# Patient Record
Sex: FEMALE | Race: WHITE | NOT HISPANIC OR LATINO | Employment: PART TIME | URBAN - METROPOLITAN AREA
[De-identification: names, ages, dates, MRNs, and addresses within clinical notes are randomized per-mention and may not be internally consistent; named-entity substitution may affect disease eponyms.]

---

## 2017-03-12 ENCOUNTER — HOSPITAL ENCOUNTER (EMERGENCY)
Facility: MEDICAL CENTER | Age: 32
End: 2017-03-12

## 2017-03-12 VITALS
OXYGEN SATURATION: 98 % | TEMPERATURE: 96.6 F | DIASTOLIC BLOOD PRESSURE: 78 MMHG | RESPIRATION RATE: 18 BRPM | SYSTOLIC BLOOD PRESSURE: 122 MMHG | HEART RATE: 77 BPM

## 2017-03-12 PROCEDURE — 302449 STATCHG TRIAGE ONLY (STATISTIC)

## 2025-02-17 ENCOUNTER — APPOINTMENT (OUTPATIENT)
Dept: RADIOLOGY | Facility: MEDICAL CENTER | Age: 40
End: 2025-02-17
Attending: EMERGENCY MEDICINE
Payer: COMMERCIAL

## 2025-02-17 ENCOUNTER — HOSPITAL ENCOUNTER (EMERGENCY)
Facility: MEDICAL CENTER | Age: 40
End: 2025-02-17
Attending: EMERGENCY MEDICINE
Payer: COMMERCIAL

## 2025-02-17 VITALS
HEART RATE: 84 BPM | DIASTOLIC BLOOD PRESSURE: 80 MMHG | HEIGHT: 64 IN | BODY MASS INDEX: 38.28 KG/M2 | RESPIRATION RATE: 20 BRPM | SYSTOLIC BLOOD PRESSURE: 133 MMHG | OXYGEN SATURATION: 96 % | TEMPERATURE: 97 F | WEIGHT: 224.21 LBS

## 2025-02-17 DIAGNOSIS — R09.81 NASAL CONGESTION: ICD-10-CM

## 2025-02-17 DIAGNOSIS — R05.9 COUGH, UNSPECIFIED TYPE: ICD-10-CM

## 2025-02-17 DIAGNOSIS — B34.9 VIRAL SYNDROME: ICD-10-CM

## 2025-02-17 LAB
ALBUMIN SERPL BCP-MCNC: 4 G/DL (ref 3.2–4.9)
ALBUMIN/GLOB SERPL: 1.3 G/DL
ALP SERPL-CCNC: 52 U/L (ref 30–99)
ALT SERPL-CCNC: 11 U/L (ref 2–50)
ANION GAP SERPL CALC-SCNC: 12 MMOL/L (ref 7–16)
AST SERPL-CCNC: 15 U/L (ref 12–45)
BASOPHILS # BLD AUTO: 0.4 % (ref 0–1.8)
BASOPHILS # BLD: 0.03 K/UL (ref 0–0.12)
BILIRUB SERPL-MCNC: 0.4 MG/DL (ref 0.1–1.5)
BUN SERPL-MCNC: 11 MG/DL (ref 8–22)
CALCIUM ALBUM COR SERPL-MCNC: 8.9 MG/DL (ref 8.5–10.5)
CALCIUM SERPL-MCNC: 8.9 MG/DL (ref 8.5–10.5)
CHLORIDE SERPL-SCNC: 104 MMOL/L (ref 96–112)
CO2 SERPL-SCNC: 24 MMOL/L (ref 20–33)
CREAT SERPL-MCNC: 0.93 MG/DL (ref 0.5–1.4)
EKG IMPRESSION: NORMAL
EOSINOPHIL # BLD AUTO: 0.21 K/UL (ref 0–0.51)
EOSINOPHIL NFR BLD: 2.5 % (ref 0–6.9)
ERYTHROCYTE [DISTWIDTH] IN BLOOD BY AUTOMATED COUNT: 41.5 FL (ref 35.9–50)
GFR SERPLBLD CREATININE-BSD FMLA CKD-EPI: 80 ML/MIN/1.73 M 2
GLOBULIN SER CALC-MCNC: 3.1 G/DL (ref 1.9–3.5)
GLUCOSE SERPL-MCNC: 125 MG/DL (ref 65–99)
HCT VFR BLD AUTO: 43.8 % (ref 37–47)
HGB BLD-MCNC: 14.6 G/DL (ref 12–16)
IMM GRANULOCYTES # BLD AUTO: 0.03 K/UL (ref 0–0.11)
IMM GRANULOCYTES NFR BLD AUTO: 0.4 % (ref 0–0.9)
LYMPHOCYTES # BLD AUTO: 3.18 K/UL (ref 1–4.8)
LYMPHOCYTES NFR BLD: 38.2 % (ref 22–41)
MCH RBC QN AUTO: 29.8 PG (ref 27–33)
MCHC RBC AUTO-ENTMCNC: 33.3 G/DL (ref 32.2–35.5)
MCV RBC AUTO: 89.4 FL (ref 81.4–97.8)
MONOCYTES # BLD AUTO: 0.53 K/UL (ref 0–0.85)
MONOCYTES NFR BLD AUTO: 6.4 % (ref 0–13.4)
NEUTROPHILS # BLD AUTO: 4.34 K/UL (ref 1.82–7.42)
NEUTROPHILS NFR BLD: 52.1 % (ref 44–72)
NRBC # BLD AUTO: 0 K/UL
NRBC BLD-RTO: 0 /100 WBC (ref 0–0.2)
NT-PROBNP SERPL IA-MCNC: <36 PG/ML (ref 0–125)
PLATELET # BLD AUTO: 240 K/UL (ref 164–446)
PMV BLD AUTO: 8.6 FL (ref 9–12.9)
POTASSIUM SERPL-SCNC: 4.2 MMOL/L (ref 3.6–5.5)
PROT SERPL-MCNC: 7.1 G/DL (ref 6–8.2)
RBC # BLD AUTO: 4.9 M/UL (ref 4.2–5.4)
SODIUM SERPL-SCNC: 140 MMOL/L (ref 135–145)
TROPONIN T SERPL-MCNC: <6 NG/L (ref 6–19)
WBC # BLD AUTO: 8.3 K/UL (ref 4.8–10.8)

## 2025-02-17 PROCEDURE — 93005 ELECTROCARDIOGRAM TRACING: CPT | Mod: TC

## 2025-02-17 PROCEDURE — 99284 EMERGENCY DEPT VISIT MOD MDM: CPT

## 2025-02-17 PROCEDURE — 83880 ASSAY OF NATRIURETIC PEPTIDE: CPT

## 2025-02-17 PROCEDURE — 85025 COMPLETE CBC W/AUTO DIFF WBC: CPT

## 2025-02-17 PROCEDURE — 80053 COMPREHEN METABOLIC PANEL: CPT

## 2025-02-17 PROCEDURE — 93005 ELECTROCARDIOGRAM TRACING: CPT | Mod: TC | Performed by: EMERGENCY MEDICINE

## 2025-02-17 PROCEDURE — 71045 X-RAY EXAM CHEST 1 VIEW: CPT

## 2025-02-17 PROCEDURE — 84484 ASSAY OF TROPONIN QUANT: CPT

## 2025-02-17 RX ORDER — FLUTICASONE PROPIONATE 50 MCG
1 SPRAY, SUSPENSION (ML) NASAL DAILY
Qty: 11.1 ML | Refills: 0 | Status: SHIPPED | OUTPATIENT
Start: 2025-02-17 | End: 2025-02-17

## 2025-02-17 RX ORDER — FLUTICASONE PROPIONATE 50 MCG
1 SPRAY, SUSPENSION (ML) NASAL DAILY
Qty: 11.1 ML | Refills: 0 | Status: SHIPPED | OUTPATIENT
Start: 2025-02-17

## 2025-02-17 NOTE — ED PROVIDER NOTES
CHIEF COMPLAINT  Chief Complaint   Patient presents with    Flu Like Symptoms     X1 wk  Sore throat, body ache, chills, cough, fever, ear ache. Headache       LIMITATION TO HISTORY   Select: none    HPI    Toya Morocho is a 39 y.o. female who presents to the Emergency Department planing of cough nasal congestion body aches.  The patient states that for the past week she has been having sore throat cough body aches chills fevers and ear ache.  The patient was seen yesterday at [facility] apparently had a negative viral panel was started on Tessalon Perles.  The patient presents today just because she is not feeling any better is complaining of increasing left ear pain nasal congestion she has been having some nosebleeds.  Patient denies any other symptoms.    OUTSIDE HISTORIAN(S):  Select: None    EXTERNAL RECORDS REVIEWED  Select: Other evaluated the patient's follow-up from yesterday [facility].      PAST MEDICAL HISTORY  Past Medical History:   Diagnosis Date    ASTHMA     Renal disorder     kidney stones, ureteral stents x2 removed     .    SURGICAL HISTORY  Past Surgical History:   Procedure Laterality Date    OTHER      ureteral stents x2    OTHER ORTHOPEDIC SURGERY      left shoulder         FAMILY HISTORY  History reviewed. No pertinent family history.       SOCIAL HISTORY  Social History     Socioeconomic History    Marital status:      Spouse name: Not on file    Number of children: Not on file    Years of education: Not on file    Highest education level: Not on file   Occupational History    Not on file   Tobacco Use    Smoking status: Never    Smokeless tobacco: Not on file   Substance and Sexual Activity    Alcohol use: No    Drug use: No    Sexual activity: Not on file   Other Topics Concern    Not on file   Social History Narrative    Not on file     Social Drivers of Health     Financial Resource Strain: Not on file   Food Insecurity: Not on file   Transportation  "Needs: Not on file   Physical Activity: Not on file   Stress: Not on file   Social Connections: Not on file   Intimate Partner Violence: Not on file   Housing Stability: Not on file         CURRENT MEDICATIONS  No current facility-administered medications on file prior to encounter.     Current Outpatient Medications on File Prior to Encounter   Medication Sig Dispense Refill    metoprolol (LOPRESSOR) 25 MG TABS Take 25 mg by mouth 2 times a day.      Prenatal Multivit-Min-Fe-FA (PRENATAL 1 + IRON PO) Take  by mouth.      albuterol (PROVENTIL) 90 MCG/ACT AERS Inhale 2 Puffs by mouth every 6 hours as needed for Shortness of Breath. 1 Inhaler 0           ALLERGIES  Allergies   Allergen Reactions    Keflex [Cephalexin] Hives    Latex        PHYSICAL EXAM  VITAL SIGNS:/83   Pulse 86   Temp 36.4 °C (97.6 °F) (Temporal)   Resp 18   Ht 1.626 m (5' 4\")   Wt 102 kg (224 lb 3.3 oz)   SpO2 97%   BMI 38.49 kg/m²     Constitutional: Well-developed no acute distress   HENT: Normocephalic, Atraumatic, Bilateral external ears normal.  Posterior pharynx is normal the patient's bilateral TMs are normal.  The patient does have some dry mucous membranes in her nose.  Eyes:  conjunctiva are normal.   Neck: Supple.  Nontender midline  Cardiovascular: Regular rate and rhythm without murmurs gallops or rubs.   Thorax & Lungs: No respiratory distress. Breathing comfortably. Lungs are clear to auscultation bilaterally, there are no wheezes no rales. Chest wall is nontender.  Abdomen: Soft, non distended, non tender   Skin: Warm, Dry, No erythema,   Back: No tenderness, No CVA tenderness.  Psychiatric: Affect normal, Judgment normal, Mood normal.       DIAGNOSTIC STUDIES / PROCEDURES  EKG  EKG interpreted below by myself    LABS    Results for orders placed or performed during the hospital encounter of 02/17/25   CBC with Differential    Collection Time: 02/17/25  9:41 AM   Result Value Ref Range    WBC 8.3 4.8 - 10.8 K/uL    RBC " 4.90 4.20 - 5.40 M/uL    Hemoglobin 14.6 12.0 - 16.0 g/dL    Hematocrit 43.8 37.0 - 47.0 %    MCV 89.4 81.4 - 97.8 fL    MCH 29.8 27.0 - 33.0 pg    MCHC 33.3 32.2 - 35.5 g/dL    RDW 41.5 35.9 - 50.0 fL    Platelet Count 240 164 - 446 K/uL    MPV 8.6 (L) 9.0 - 12.9 fL    Neutrophils-Polys 52.10 44.00 - 72.00 %    Lymphocytes 38.20 22.00 - 41.00 %    Monocytes 6.40 0.00 - 13.40 %    Eosinophils 2.50 0.00 - 6.90 %    Basophils 0.40 0.00 - 1.80 %    Immature Granulocytes 0.40 0.00 - 0.90 %    Nucleated RBC 0.00 0.00 - 0.20 /100 WBC    Neutrophils (Absolute) 4.34 1.82 - 7.42 K/uL    Lymphs (Absolute) 3.18 1.00 - 4.80 K/uL    Monos (Absolute) 0.53 0.00 - 0.85 K/uL    Eos (Absolute) 0.21 0.00 - 0.51 K/uL    Baso (Absolute) 0.03 0.00 - 0.12 K/uL    Immature Granulocytes (abs) 0.03 0.00 - 0.11 K/uL    NRBC (Absolute) 0.00 K/uL   Comp Metabolic Panel    Collection Time: 02/17/25  9:41 AM   Result Value Ref Range    Sodium 140 135 - 145 mmol/L    Potassium 4.2 3.6 - 5.5 mmol/L    Chloride 104 96 - 112 mmol/L    Co2 24 20 - 33 mmol/L    Anion Gap 12.0 7.0 - 16.0    Glucose 125 (H) 65 - 99 mg/dL    Bun 11 8 - 22 mg/dL    Creatinine 0.93 0.50 - 1.40 mg/dL    Calcium 8.9 8.5 - 10.5 mg/dL    Correct Calcium 8.9 8.5 - 10.5 mg/dL    AST(SGOT) 15 12 - 45 U/L    ALT(SGPT) 11 2 - 50 U/L    Alkaline Phosphatase 52 30 - 99 U/L    Total Bilirubin 0.4 0.1 - 1.5 mg/dL    Albumin 4.0 3.2 - 4.9 g/dL    Total Protein 7.1 6.0 - 8.2 g/dL    Globulin 3.1 1.9 - 3.5 g/dL    A-G Ratio 1.3 g/dL   proBrain Natriuretic Peptide, NT    Collection Time: 02/17/25  9:41 AM   Result Value Ref Range    NT-proBNP <36 0 - 125 pg/mL   Troponin    Collection Time: 02/17/25  9:41 AM   Result Value Ref Range    Troponin T <6 6 - 19 ng/L   ESTIMATED GFR    Collection Time: 02/17/25  9:41 AM   Result Value Ref Range    GFR (CKD-EPI) 80 >60 mL/min/1.73 m 2   EKG    Collection Time: 02/17/25 10:07 AM   Result Value Ref Range    Report       [facility]  [place] [facility].    Test Date:  2025  Pt Name:    CUCO AVILA               Department: ER  MRN:        [id number]                      Room:  Gender:     Female                       Technician: [id number]  :        1985                   Requested By:ER TRIAGE PROTOCOL  Order #:    [id number]                    Reading MD: SHIKHA DICKSON MD    Measurements  Intervals                                Axis  Rate:       90                           P:          57  PA:         135                          QRS:        65  QRSD:       90                           T:          32  QT:         364  QTc:        446    Interpretive Statements  Sinus rhythm  No previous ECG available for comparison  normal ECG  Electronically Signed On 2025 10:07:13 PST by SHIKHA DICKSON MD         RADIOLOGY    I have independently interpreted the diagnostic imaging associated with this visit and am waiting the final reading from the radiologist.   My preliminary interpretation is as follows: No acute infiltrates on chest x-ray.    Radiologist interpretation:  DX-CHEST-PORTABLE (1 VIEW)   Final Result         1. No acute cardiopulmonary abnormalities are identified.            COURSE & MEDICAL DECISION MAKING    ED COURSE:    ED Observation Status?  No, the patient does not meet observation criteria.  INTERVENTIONS BY ME:  Medications - No data to display        INITIAL ASSESSMENT, COURSE AND PLAN  Care Narrative: Clinically the patient appears well she does have some nasal congestion.  At this point I do not feel antibiotics are necessary the patient's white blood cell count is normal.  I did recommend a regimen on how to help with her nasal congestion decongestants with the use of over-the-counter decongestants, nasal saline as well as Flonase.  The patient to continue this and follow with her primary care physician in 1 to 2 weeks if she is tells continued symptoms return if any symptoms worsen.        ADDITIONAL  PROBLEM LIST  None  DISPOSITION AND DISCUSSIONS    I have discussed management of the patient with the following physicians and MEAGHAN's: None    Escalation of care considered, and ultimately not performed: None    Barriers to care at this time, including but not limited to: None.     Decision tools and prescription drugs considered including, but not limited to: Recommended OTC medications for pain control as well as the description as above     FINAL DIAGNOSIS  1. Nasal congestion    2. Cough, unspecified type    3. Viral syndrome           The patient will return for new or worsening symptoms and is stable at the time of discharge.    The patient is referred to a primary physician for blood pressure management, diabetic screening, and for all other preventative health concerns.        DISPOSITION:  Patient will be discharged home in stable condition.    FOLLOW UP:  [facility] ([facility]) - Primary Care and Family Medicine  [address] [facility] [address] [facility] [address]  [phone]        [facility] - Behavioral Health Counseling  [address] [facility] [address]  [phone]        [facility] [address], [address]  [facility] [id number] [phone]        [facility]  [address] [facility] [address]  [phone]          OUTPATIENT MEDICATIONS:  New Prescriptions    FLUTICASONE (FLONASE) 50 MCG/ACT NASAL SPRAY    Administer 1 Spray into affected nostril(S) every day.             Electronically signed by: Scottie Soriano M.D.,10:35 AM 02/17/25

## 2025-02-17 NOTE — ED TRIAGE NOTES
"Chief Complaint   Patient presents with    Flu Like Symptoms     X1 wk  Sore throat, body ache, chills, cough, fever, ear ache. Headache     Pt was seen @ [facility] yesterday and tested negative for respiratory panel     /83   Pulse 86   Temp 36.4 °C (97.6 °F) (Temporal)   Resp 18   Ht 1.626 m (5' 4\")   Wt 102 kg (224 lb 3.3 oz)   SpO2 97%   BMI 38.49 kg/m²       Pt is alert/oriented and follows commands. Pt speaking in full sentences and responds appropriately to questions. No acute distress noted in triage and respirations are even and unlabored.      Pt placed in lobby and educated on triage process. Pt encouraged to alert staff for any changes in condition.    "

## 2025-02-17 NOTE — ED NOTES
Pt discharged home as ordered by erp. Pt instructed to follow up with their PCP and return here as need. Pt instructed on where to  RXs. Pt verbalized understanding and left ambulating independently with family

## 2025-02-17 NOTE — Clinical Note
Toya Morocho was seen and treated in our emergency department on 2/17/2025.  She may return to work on 02/21/2025.       If you have any questions or concerns, please don't hesitate to call.      Scottie Soriano M.D.

## 2025-02-21 ENCOUNTER — HOSPITAL ENCOUNTER (EMERGENCY)
Facility: MEDICAL CENTER | Age: 40
End: 2025-02-21
Attending: EMERGENCY MEDICINE
Payer: COMMERCIAL

## 2025-02-21 VITALS
TEMPERATURE: 98.5 F | OXYGEN SATURATION: 97 % | WEIGHT: 226.85 LBS | HEART RATE: 72 BPM | SYSTOLIC BLOOD PRESSURE: 148 MMHG | RESPIRATION RATE: 16 BRPM | DIASTOLIC BLOOD PRESSURE: 98 MMHG | BODY MASS INDEX: 38.94 KG/M2

## 2025-02-21 DIAGNOSIS — S46.911A SHOULDER STRAIN, RIGHT, INITIAL ENCOUNTER: ICD-10-CM

## 2025-02-21 PROCEDURE — A9270 NON-COVERED ITEM OR SERVICE: HCPCS | Performed by: EMERGENCY MEDICINE

## 2025-02-21 PROCEDURE — 700102 HCHG RX REV CODE 250 W/ 637 OVERRIDE(OP): Performed by: EMERGENCY MEDICINE

## 2025-02-21 PROCEDURE — 99283 EMERGENCY DEPT VISIT LOW MDM: CPT

## 2025-02-21 RX ORDER — ACETAMINOPHEN 325 MG/1
650 TABLET ORAL ONCE
Status: COMPLETED | OUTPATIENT
Start: 2025-02-21 | End: 2025-02-21

## 2025-02-21 RX ADMIN — ACETAMINOPHEN 650 MG: 325 TABLET ORAL at 14:57

## 2025-02-21 ASSESSMENT — PAIN DESCRIPTION - PAIN TYPE
TYPE: ACUTE PAIN
TYPE: ACUTE PAIN

## 2025-02-21 ASSESSMENT — FIBROSIS 4 INDEX: FIB4 SCORE: 0.72

## 2025-02-21 NOTE — ED TRIAGE NOTES
"Chief Complaint   Patient presents with    Shoulder Pain     Dropped car battery and caught it felt something \"POP\"     CMS +.    BP (!) 157/105   Pulse 75   Temp 36.9 °C (98.5 °F) (Temporal)   Resp 14   Wt 103 kg (226 lb 13.7 oz)   SpO2 96%   Pt informed of wait times. Educated on triage process.  Asked to return to triage RN for any new or worsening of symptoms. Thanked for patience.      "

## 2025-02-21 NOTE — ED PROVIDER NOTES
"ED Provider Note    CHIEF COMPLAINT  Chief Complaint   Patient presents with    Shoulder Pain     Dropped car battery and caught it felt something \"POP\"     HPI/ROS  Toya Morocho is a 39 y.o. female who presents with right shoulder pain.  The patient states she was attempting to change a car battery when she dropped it and felt the sudden onset of pain in the right shoulder and trapezius region.  She states that the battery pulled on her right arm.  She does have full range of motion although some discomfort.  She not have any direct trauma to the shoulder.  She states the pain seems to be extending up into the lateral aspect of the right side of her neck.  She does not have any paresthesias nor functional loss of her right hand.    PAST MEDICAL HISTORY   has a past medical history of ASTHMA and Renal disorder.    SURGICAL HISTORY   has a past surgical history that includes other and other orthopedic surgery.    FAMILY HISTORY  No family history on file.    SOCIAL HISTORY  Social History     Tobacco Use    Smoking status: Never    Smokeless tobacco: Not on file   Substance and Sexual Activity    Alcohol use: No    Drug use: No    Sexual activity: Not on file       CURRENT MEDICATIONS  Home Medications       Reviewed by Jessica Woodall R.N. (Registered Nurse) on 02/21/25 at 1423  Med List Status: Partial     Medication Last Dose Status   albuterol (PROVENTIL) 90 MCG/ACT AERS  Active   fluticasone (FLONASE) 50 MCG/ACT nasal spray  Active   metoprolol (LOPRESSOR) 25 MG TABS  Active   Prenatal Multivit-Min-Fe-FA (PRENATAL 1 + IRON PO)  Active                    ALLERGIES  Allergies   Allergen Reactions    Keflex [Cephalexin] Hives    Latex        PHYSICAL EXAM  VITAL SIGNS: BP (!) 157/105   Pulse 75   Temp 36.9 °C (98.5 °F) (Temporal)   Resp 14   Wt 103 kg (226 lb 13.7 oz)   SpO2 96%   BMI 38.94 kg/m²    In general the patient does not appear toxic    Extremities patient has diffuse discomfort " throughout the right shoulder without obvious deformities.  She has a normal right wrist and right elbow exam.    Skin no erythema nor induration    Neurovascular examination is grossly intact to the right upper extremity        COURSE & MEDICAL DECISION MAKING    This a 39-year-old female who presents emerged part with signs and symptoms consistent with a right shoulder strain.  She did not have any direct trauma to warrant imaging.  She has full range of motion therefore dislocation would be very unlikely.  The patiently placed in a sling for comfort and she will be treated with Motrin, Tylenol, and ice.  I would like her to recheck with orthopedics in 1 week if she is not asymptomatic.    FINAL DIAGNOSIS  Right shoulder strain    Disposition  The patient will be discharged in stable condition     Electronically signed by: Alphonso Callahan M.D., 2/21/2025 2:47 PM

## 2025-02-21 NOTE — DISCHARGE INSTRUCTIONS
Utilize ice, Motrin, and Tylenol as discussed.  Recheck with orthopedics in 1 week if you are not better

## 2025-04-10 ENCOUNTER — HOSPITAL ENCOUNTER (EMERGENCY)
Facility: MEDICAL CENTER | Age: 40
End: 2025-04-10
Attending: EMERGENCY MEDICINE
Payer: COMMERCIAL

## 2025-04-10 VITALS
WEIGHT: 228.4 LBS | HEART RATE: 73 BPM | RESPIRATION RATE: 18 BRPM | SYSTOLIC BLOOD PRESSURE: 123 MMHG | OXYGEN SATURATION: 97 % | DIASTOLIC BLOOD PRESSURE: 97 MMHG | TEMPERATURE: 97.2 F | BODY MASS INDEX: 39.2 KG/M2

## 2025-04-10 DIAGNOSIS — F41.0 PANIC ATTACK: ICD-10-CM

## 2025-04-10 DIAGNOSIS — Z76.0 MEDICATION REFILL: ICD-10-CM

## 2025-04-10 PROCEDURE — 99283 EMERGENCY DEPT VISIT LOW MDM: CPT | Mod: EDC

## 2025-04-10 PROCEDURE — 700102 HCHG RX REV CODE 250 W/ 637 OVERRIDE(OP): Performed by: EMERGENCY MEDICINE

## 2025-04-10 PROCEDURE — A9270 NON-COVERED ITEM OR SERVICE: HCPCS | Performed by: EMERGENCY MEDICINE

## 2025-04-10 PROCEDURE — RXMED WILLOW AMBULATORY MEDICATION CHARGE: Performed by: EMERGENCY MEDICINE

## 2025-04-10 RX ORDER — ESCITALOPRAM OXALATE 10 MG/1
20 TABLET ORAL DAILY
Status: DISCONTINUED | OUTPATIENT
Start: 2025-04-10 | End: 2025-04-10 | Stop reason: HOSPADM

## 2025-04-10 RX ORDER — ESCITALOPRAM OXALATE 20 MG/1
20 TABLET ORAL DAILY
COMMUNITY

## 2025-04-10 RX ORDER — ALPRAZOLAM 0.25 MG
0.5 TABLET ORAL ONCE
Status: COMPLETED | OUTPATIENT
Start: 2025-04-10 | End: 2025-04-10

## 2025-04-10 RX ORDER — ESCITALOPRAM OXALATE 20 MG/1
20 TABLET ORAL DAILY
Qty: 30 TABLET | Refills: 0 | Status: SHIPPED | OUTPATIENT
Start: 2025-04-10

## 2025-04-10 RX ORDER — DIAZEPAM 5 MG/1
5 TABLET ORAL ONCE
Status: DISCONTINUED | OUTPATIENT
Start: 2025-04-10 | End: 2025-04-10 | Stop reason: CLARIF

## 2025-04-10 RX ORDER — ALPRAZOLAM 0.5 MG
0.5 TABLET ORAL NIGHTLY PRN
Qty: 4 TABLET | Refills: 0 | Status: SHIPPED | OUTPATIENT
Start: 2025-04-10 | End: 2025-04-15

## 2025-04-10 RX ADMIN — ALPRAZOLAM 0.5 MG: 0.25 TABLET ORAL at 09:52

## 2025-04-10 RX ADMIN — ESCITALOPRAM OXALATE 20 MG: 10 TABLET ORAL at 10:16

## 2025-04-10 ASSESSMENT — FIBROSIS 4 INDEX: FIB4 SCORE: 0.72

## 2025-04-10 ASSESSMENT — PAIN DESCRIPTION - PAIN TYPE: TYPE: ACUTE PAIN

## 2025-04-10 NOTE — ED PROVIDER NOTES
CHIEF COMPLAINT  Chief Complaint   Patient presents with    Panic Attack       LIMITATION TO HISTORY   Select: none    HPI    Toya Morocho is a 39 y.o. female who presents to the Emergency Department for evaluation of a panic attack, onset prior to arrival today. Patient reports that her mother is currently up stairs in the ICU, and she received upsetting news regarding her mother's current status. She normally takes lexapro, and has been off of that medication for four days now, and began having a panic attack after she found the news about her mother today. She reports feeling as though her chest is tight, and is having a difficult time breathing due to her anxiety.  Patient does not have any of her medications because they are at her [facility] as described above.    OUTSIDE HISTORIAN(S):  Select: None.     EXTERNAL RECORDS REVIEWED  Select: Patient last seen in the ED 2/21 for shoulder pain, but did not require imaging and was discharged home.     PAST MEDICAL HISTORY  Past Medical History:   Diagnosis Date    ASTHMA     Renal disorder     kidney stones, ureteral stents x2 removed     .    SURGICAL HISTORY  Past Surgical History:   Procedure Laterality Date    OTHER      ureteral stents x2    OTHER ORTHOPEDIC SURGERY      left shoulder         FAMILY HISTORY  History reviewed. No pertinent family history.       SOCIAL HISTORY  Social History     Tobacco Use    Smoking status: Never   Substance Use Topics    Alcohol use: No    Drug use: No         CURRENT MEDICATIONS  No current facility-administered medications on file prior to encounter.     Current Outpatient Medications on File Prior to Encounter   Medication Sig Dispense Refill    escitalopram (LEXAPRO) 20 MG tablet Take 20 mg by mouth every day.      fluticasone (FLONASE) 50 MCG/ACT nasal spray Administer 1 Spray into affected nostril(S) every day. 11.1 mL 0    metoprolol (LOPRESSOR) 25 MG TABS Take 25 mg by mouth 2 times a day.       Prenatal Multivit-Min-Fe-FA (PRENATAL 1 + IRON PO) Take  by mouth.      albuterol (PROVENTIL) 90 MCG/ACT AERS Inhale 2 Puffs by mouth every 6 hours as needed for Shortness of Breath. 1 Inhaler 0         ALLERGIES  Allergies   Allergen Reactions    Keflex [Cephalexin] Hives    Latex        PHYSICAL EXAM  VITAL SIGNS:BP (!) 134/98   Pulse 87   Temp 36.4 °C (97.6 °F) (Temporal)   Resp 16   Wt 104 kg (228 lb 6.3 oz)   SpO2 98%   BMI 39.20 kg/m²     Constitutional: Well-developed. Tearful and anxious  HENT: Normocephalic, Atraumatic, Bilateral external ears normal.  Eyes:  conjunctiva are normal.   Neck: Supple.  Nontender midline  Cardiovascular: Regular rate and rhythm without murmurs gallops or rubs.   Thorax & Lungs: No respiratory distress. Breathing comfortably. Lungs are clear to auscultation bilaterally, there are no wheezes no rales. Chest wall is nontender.  Abdomen: Soft, non distended, non tender   Skin: Warm, Dry, No erythema,   Back: No tenderness, No CVA tenderness.  Musculoskeletal: No clubbing cyanosis or edema good range of motion   Neurologic: Alert & oriented x 3, normal sensation moving all extremities appears normal   Psychiatric:Tearful and anxious    COURSE & MEDICAL DECISION MAKING    ED COURSE:    ED Observation Status? No, The patient does not qualify for observation status     INTERVENTIONS BY ME:  Medications   escitalopram (Lexapro) tablet 20 mg (has no administration in time range)   ALPRAZolam (Xanax) tablet 0.5 mg (0.5 mg Oral Given 4/10/25 0952)       9:47 AM - Patient seen and examined at bedside. She arrives today after beginning to have a panic attack that came on after she found out upsetting news about her mother's condition, who is in the ICU at this time. She has been off of her Lexapro for four days now. Discussed plan of care, including providing medication to make her feel better. Patient agrees to the plan of care. I discussed plan for discharge and follow up as outlined  below. The patient is stable for discharge at this time and will return for any new or worsening symptoms. Patient verbalizes understanding and support with my plan for discharge.        INITIAL ASSESSMENT, COURSE AND PLAN  Care Narrative: Patient arrives today  tearful and anxious upon arrival to bedside. She found out that her mother's condition is worsening in the ICU today, and began having a panic attack. She is currently off of her Lexapro for four days now. I informed her of my plan to provide her with Xanax and Lexapro upon discharge and she agrees to this plan of care.  At this point I do not feel that any further emergent evaluation is necessary have given the patient a dose of Xanax as well as her Lexapro.  The patient is feeling improved and I do feel stable for discharge.    DISPOSITION AND DISCUSSIONS  I have discussed management of the patient with the following physicians and MEAGHAN's: None    Escalation of care considered, and ultimately not performed: None    Barriers to care at this time, including but not limited to: No local primary care provider.     Decision tools and prescription drugs considered including, but not limited to: As described above.       The patient will return for new or worsening symptoms and is stable at the time of discharge.    The patient is referred to a primary physician for blood pressure management, diabetic screening, and for all other preventative health concerns.    I reviewed prescription monitoring program for patient's narcotic use before prescribing a scheduled drug.The patient will not drink alcohol nor drive with prescribed medications      DISPOSITION:  Patient will be discharged home in stable condition.    FOLLOW UP:  No follow-up provider specified.    OUTPATIENT MEDICATIONS:  New Prescriptions    ALPRAZOLAM (XANAX) 0.5 MG TAB    Take 1 Tablet by mouth at bedtime as needed for Sleep for up to 4 days.    ESCITALOPRAM (LEXAPRO) 20 MG TABLET    Take 1 Tablet by  mouth every day.        FINAL DIAGNOSIS  1. Panic attack    2. Medication refill         Zion EUBANKS (Scribe), am scribing for, and in the presence of, Scottie Soriano M.D..    Electronically signed by: Zion Mirza (Scribjaya), 4/10/2025    Scottie EUBANKS M.D. personally performed the services described in this documentation, as scribed by Zion Mirza in my presence, and it is both accurate and complete.     Electronically signed by: Scottie Soriano M.D.,9:53 AM 04/10/25

## 2025-04-10 NOTE — ED NOTES
Triage note reviewed. Patient is very tearful and upset but pleasant. Patient reports her mother was brought in by care flight to hospital a few days ago and is likely never going to wake up and dying. Patient has not had her lexapro in four days. She just picked up from pharmacy recently. Has not taken any medications PTA. Patient received bad news about 20 minutes ago. Gown provided. Chart up for ERP.

## 2025-04-10 NOTE — ED NOTES
Educated patient on discharge instructions, rx medications xanax and lexapro and follow up with PCP, No follow-up provider specified.  ; voiced understanding rec'vd. VS stable, BP (!) 123/97   Pulse 73   Temp 36.2 °C (97.2 °F) (Temporal)   Resp 18   Wt 104 kg (228 lb 6.3 oz)   SpO2 97%   BMI 39.20 kg/m²    Patient alert and appropriate. Skin PWD. NAD. All questions and concerns addressed. No further questions or concerns at this time. Copy of discharge paperwork provided.  Patient out of department in stable condition, calm.

## 2025-04-10 NOTE — ED TRIAGE NOTES
.Toya Morocho  .  Chief Complaint   Patient presents with    Panic Attack     Patient ambulatory to triage with above complaint. Patient's mother is currently at patient in [facility] ICU and patient received upsetting news regarding mother's status. Patient takes lexapro daily and has not had it in 4 days due to forgetting it at home while staying at hospital with mother.     Patient to lobby and instructed to inform staff of any needs.

## 2025-04-11 ENCOUNTER — PHARMACY VISIT (OUTPATIENT)
Dept: PHARMACY | Facility: MEDICAL CENTER | Age: 40
End: 2025-04-11
Payer: COMMERCIAL

## 2025-06-05 ENCOUNTER — HOSPITAL ENCOUNTER (EMERGENCY)
Facility: MEDICAL CENTER | Age: 40
End: 2025-06-05

## 2025-06-05 VITALS
RESPIRATION RATE: 16 BRPM | OXYGEN SATURATION: 98 % | SYSTOLIC BLOOD PRESSURE: 151 MMHG | BODY MASS INDEX: 39.37 KG/M2 | TEMPERATURE: 98.2 F | DIASTOLIC BLOOD PRESSURE: 99 MMHG | HEIGHT: 64 IN | WEIGHT: 230.6 LBS | HEART RATE: 75 BPM

## 2025-06-05 PROCEDURE — 302449 STATCHG TRIAGE ONLY (STATISTIC)

## 2025-06-05 ASSESSMENT — FIBROSIS 4 INDEX: FIB4 SCORE: 0.63

## 2025-06-05 NOTE — ED TRIAGE NOTES
"BP (!) 151/99   Pulse 75   Temp 36.8 °C (98.2 °F) (Temporal)   Resp 16   Ht 1.632 m (5' 4.25\")   Wt 105 kg (230 lb 9.6 oz)   SpO2 98%   BMI 39.28 kg/m²   Chief Complaint   Patient presents with    Arm Pain     Pt comes in w/ daughter  has an nexplanon in her R upper inner arm   she is in the process of moving and the device has moved causing pain       Comes in w/ daughter   "